# Patient Record
(demographics unavailable — no encounter records)

---

## 2025-05-21 NOTE — HISTORY OF PRESENT ILLNESS
[FreeTextEntry1] : 59-year-old female here for follow-up right elbow lateral condyle lightest.  This been going on several months now.  She was given a corticosteroid injection at her prior visit.  She states this lasted approximately 10 days for pain relief however it has slowly began to resolve.  She is fearful that she may have bone cancer in this region as her mother  from bone cancer.  She is requesting an MRI today.

## 2025-05-21 NOTE — ASSESSMENT
[FreeTextEntry1] : - She did experience relief from the cortisone injection.  Her pain is significant at this time however.  She is requesting an MRI.  We will obtain an MRI at this time point to evaluate for any bony lesion as well as to evaluate the status of the common extensor origin and L UCL given the timeline as well as the history - We discussed meloxicam and the patient will trial 2-week course. Patient advised not to take any NSAIDs at this time.  We discussed potential GI effects as well as kidney effects. Pain denies history of GI or kidney issues and will discontinue the medication immediately if she does notice stomach problems.  Advised to take with food. - She is approaching subacute/chronic phase.  We discussed our surgical options if necessary if her symptoms remain recalcitrant despite all other modalities. - PT prescription placed today - I again reinforced that this may take several months to improve - She will follow-up in 4 to 6 weeks

## 2025-05-21 NOTE — PHYSICAL EXAM
[de-identified] :  Right elbow -Full range of motion, able to reach terminal extension and deep flexion, full pronosupination -Tenderness directly over the ECRB insertion on the lateral condyle -Pain with resisted wrist extension -No tenderness palpation over the radial tunnel -Skin over lateral epicondyle without abrasion or injury